# Patient Record
(demographics unavailable — no encounter records)

---

## 2019-03-16 NOTE — ERD
ER Documentation


Chief Complaint


Chief Complaint





pelvic pain also vaginal bleeding x 15 days after removing IUD





ROS


All systems reviewed and are negative except as per history of present illness.





Medications


Home Meds


Active Scripts


Hydrocodone/Acetaminophen (Norco 5-325 Tablet) 1 Each Tablet, 1 TAB PO Q6H PRN 


for PAIN, #10 TAB


   Prov:MARTHA CORCORAN DO         3/16/19


Ibuprofen* (Motrin*) 600 Mg Tab, 600 MG PO Q6H PRN for PAIN AND OR ELEVATED 


TEMP, #30 TAB


   Prov:MARTHA CORCORAN DO         3/16/19


Docusate Sodium* (Colace*) 100 Mg Capsule, 100 MG PO TID, #30 CAP


   Prov:TONYA TOLLIVER PA-C         18


Ferrous Sulfate* (Ferrous Sulfate*) 325 Mg Tabec, 325 MG PO DAILY, #30 TAB


   Prov:TONYA TOLLIVER PA-C         18


Naproxen* (Naprosyn*) 500 Mg Tablet, 500 MG PO BID PRN for PAIN AND/OR 


INFLAMMATION, #30 TAB


   Prov:TONYA TOLLIVER PA-C         18


Norethindrone-E.estradiol-Iron (Loestrin Fe 1.5-30 Tablet) 1 Each Tablet, 1 EACH


PO DAILY, #30 TAB


   Prov:TONYA TOLLIVER PA-C         18





Allergies


Allergies:  


Coded Allergies:  


     No Known Allergy (Unverified , 3/16/19)





PMhx/Soc


History of Surgery:  Yes ()


Anesthesia Reaction:  No


Hx Neurological Disorder:  No


Hx Respiratory Disorders:  No


Hx Cardiac Disorders:  No


Hx Psychiatric Problems:  No


Hx Miscellaneous Medical Probl:  Yes (elevated platelets)


Hx Alcohol Use:  No


Hx Substance Use:  No


Hx Tobacco Use:  No


Smoking Status:  Never smoker





Physical Exam


Vitals





Vital Signs


  Date      Temp  Pulse  Resp  B/P (MAP)   Pulse Ox  O2          O2 Flow    FiO2


Time                                                 Delivery    Rate


   3/16/19  99.3    106    18      134/79       100


     15:42                           (97)





Physical Exam


Const:   No acute distress


Head:   Atraumatic 


Eyes:    Normal Conjunctiva


ENT:    Normal External Ears, Nose and Mouth.


Neck:               Full range of motion. No meningismus.


Resp:   Clear to auscultation bilaterally


Cardio:   Regular rate and rhythm, no murmurs


Abd:    Soft, non tender, non distended. Normal bowel sounds


Skin:   No petechiae or rashes


Back:   No midline or flank tenderness


Ext:    No cyanosis, or edema


Neur:   Awake and alert


Psych:    Normal Mood and Affect


Result Diagram:  


3/16/19 1820                                                                    


           3/16/19 1820





Results 24 hrs





Laboratory Tests


      Test
                                 3/16/19
18:20    3/16/19
18:30


      White Blood Count                     10.0 10^3/ul


      Red Blood Count                       4.43 10^6/ul


      Hemoglobin                               10.6 g/dl


      Hematocrit                                  34.3 %


      Mean Corpuscular Volume                    77.4 fl


      Mean Corpuscular Hemoglobin                23.9 pg


      Mean Corpuscular Hemoglobin
Concent     30.9 g/dl 
  



      Red Cell Distribution Width                 16.3 %


      Platelet Count                         546 10^3/UL


      Mean Platelet Volume                        9.1 fl


      Immature Granulocytes %                    0.500 %


      Neutrophils %                               50.9 %


      Lymphocytes %                               38.0 %


      Monocytes %                                  7.9 %


      Eosinophils %                                2.1 %


      Basophils %                                  0.6 %


      Nucleated Red Blood Cells %            0.0 /100WBC


      Immature Granulocytes #              0.050 10^3/ul


      Neutrophils #                          5.1 10^3/ul


      Lymphocytes #                          3.8 10^3/ul


      Monocytes #                            0.8 10^3/ul


      Eosinophils #                          0.2 10^3/ul


      Basophils #                            0.1 10^3/ul


      Nucleated Red Blood Cells #            0.0 10^3/ul


      Sodium Level                            141 mmol/L


      Potassium Level                         3.7 mmol/L


      Chloride Level                          108 mmol/L


      Carbon Dioxide Level                     23 mmol/L


      Anion Gap                                       10


      Blood Urea Nitrogen                       11 mg/dl


      Creatinine                              0.57 mg/dl


      Est Glomerular Filtrat Rate
mL/min   > 60 mL/min 
   



      Glucose Level                            100 mg/dl


      Calcium Level                            9.5 mg/dl


      Total Bilirubin                          0.1 mg/dl


      Direct Bilirubin                        0.00 mg/dl


      Indirect Bilirubin                       0.1 mg/dl


      Aspartate Amino Transf
(AST/SGOT)         21 IU/L 
  



      Alanine Aminotransferase
(ALT/SGPT)       34 IU/L 
  



      Alkaline Phosphatase                      112 IU/L


      Total Protein                             8.4 g/dl


      Albumin                                   4.2 g/dl


      Globulin                                 4.20 g/dl


      Albumin/Globulin Ratio                        1.00


      Serum HCG, Qualitative               NEGATIVE


      Urine Color                                          CLYDE


      Urine Clarity                                        CLOUDY


      Urine pH                                                        5.0


      Urine Specific Gravity                                        1.038


      Urine Ketones                                        NEGATIVE mg/dL


      Urine Nitrite                                        NEGATIVE mg/dL


      Urine Bilirubin                                      NEGATIVE mg/dL


      Urine Urobilinogen                                   NEGATIVE mg/dL


      Urine Leukocyte Esterase                             TRACE Edie/ul


      Urine Microscopic RBC                                > 182 /HPF


      Urine Microscopic WBC                                        5 /HPF


      Urine Mucus                                          FEW /HPF


      Urine Hemoglobin                                           3+ mg/dL


      Urine Glucose                                        NEGATIVE mg/dL


      Urine Total Protein                                        2+ mg/dl





Current Medications


 Medications
   Dose
          Sig/Dre
       Start Time
   Status  Last


 (Trade)       Ordered        Route
 PRN     Stop Time              Admin
Dose


                              Reason                                Admin


                1 tab          ONCE  ONCE
    3/16/19       DC           3/16/19


Acetaminophen                 PO
            18:30
                       18:33



/
                                           3/16/19 18:31


Hydrocodone


Bitart



(Norco


(5/325))


 Ondansetron    4 mg           ONCE  STAT
    3/16/19       DC           3/16/19


HCl
  (Zofran                 ODT
           18:04
                       18:33



Odt)                                         3/16/19 18:07








Departure


Diagnosis:  


   Primary Impression:  


   Vaginal bleeding


   Additional Impression:  


   Pelvic pain


Condition:  Fair


Patient Instructions:  Pelvic Pain, Unknown Cause


Referrals:  


COMM[facility]ITY CLINICS


YOU HAVE RECEIVED A MEDICAL SCREENING EXAM AND THE RESULTS INDICATE THAT YOU DO 


NOT HAVE A CONDITION THAT REQUIRES URGENT TREATMENT IN THE EMERGENCY DEPARTMENT.





FURTHER EVALUATION AND TREATMENT OF YOUR CONDITION CAN WAIT UNTIL YOU ARE SEEN 


IN YOUR DOCTORS OFFICE WITHIN THE NEXT 1-2 DAYS. IT IS YOUR RESPONSIBILITY TO 


MAKE AN APPOINTMENT FOR FOLOW-UP CARE.





IF YOU HAVE A PRIMARY DOCTOR


--you should call your primary doctor and schedule an appointment





IF YOU DO NOT HAVE A PRIMARY DOCTOR YOU CAN CALL OUR PHYSICIAN REFERRAL HOTLINE 


AT


 [phone] 





IF YOU CAN NOT AFFORD TO SEE A PHYSICIAN YOU CAN CHOSE FROM THE FOLLOWING 


[facility]





[facility] [phone] [address] [facility] [place] [facility] [phone] [address] [facility]. [facility] [phone] [address] [facility]. [facility] [phone] [address] [facility] [phone] [address] [facility]. [facility] [phone] [address] [place]








OB/GYN REFERRAL LIST


SHANNAN MAGUIRE MD


[address] [facility] 


[address] [place] [id number] [phone] OFFICE FAX [phone]





, AL[place]


[address] [place], [place] [id number] [phone]





DR. PRYOR, Wirt


[address] [place] [address]


[phone]





DR WATKINS, [facility]


[address] [facility], [address], [place] [address]


[phone]





DR [facility], [facility]


[address] [place] [address]


[phone]





[facility]


[address] [place] [address]


[phone] [address] [facility] [address]


[phone]





DR COYNE, [place]


[address], [place] [address]


[phone]





DR ESQUIVEL, ANGEL


[address] [facility]. [address], [place] [address]


[phone]





DR ANGULO, FARID


[address] [place], [place] [id number] [phone]





Additional Instructions:  


Llame al doctor MAANA y jose armando popeye ALISSA PARA DENTRO DE 1-2 BASSETT.Dgale a la 


secretaria que nosotros le instruimos hacer esta alissa.Avise o llame si lin 


condicin se empeora antes de la alissa. Regresa aqui si peor o no mejor.











MARTHA CORCORAN DO                 Mar 16, 2019 20:36